# Patient Record
Sex: FEMALE | Race: WHITE | NOT HISPANIC OR LATINO | Employment: UNEMPLOYED | URBAN - METROPOLITAN AREA
[De-identification: names, ages, dates, MRNs, and addresses within clinical notes are randomized per-mention and may not be internally consistent; named-entity substitution may affect disease eponyms.]

---

## 2021-07-21 ENCOUNTER — TRANSFERRED RECORDS (OUTPATIENT)
Dept: HEALTH INFORMATION MANAGEMENT | Facility: CLINIC | Age: 26
End: 2021-07-21

## 2021-07-21 ENCOUNTER — LAB REQUISITION (OUTPATIENT)
Dept: LAB | Facility: CLINIC | Age: 26
End: 2021-07-21
Payer: MEDICAID

## 2021-07-21 DIAGNOSIS — O99.013 ANEMIA COMPLICATING PREGNANCY, THIRD TRIMESTER: ICD-10-CM

## 2021-07-21 LAB
ABO/RH(D): NORMAL
BASOPHILS # BLD AUTO: 0 10E3/UL (ref 0–0.2)
BASOPHILS NFR BLD AUTO: 0 %
EOSINOPHIL # BLD AUTO: 0 10E3/UL (ref 0–0.7)
EOSINOPHIL NFR BLD AUTO: 0 %
ERYTHROCYTE [DISTWIDTH] IN BLOOD BY AUTOMATED COUNT: 15.6 % (ref 10–15)
HBV SURFACE AG SERPL QL IA: NONREACTIVE
HCT VFR BLD AUTO: 33 % (ref 35–47)
HGB BLD-MCNC: 11 G/DL (ref 11.7–15.7)
HIV 1+2 AB+HIV1 P24 AG SERPL QL IA: NEGATIVE
IMM GRANULOCYTES # BLD: 0.1 10E3/UL
IMM GRANULOCYTES NFR BLD: 1 %
LYMPHOCYTES # BLD AUTO: 1.4 10E3/UL (ref 0.8–5.3)
LYMPHOCYTES NFR BLD AUTO: 21 %
MCH RBC QN AUTO: 30.9 PG (ref 26.5–33)
MCHC RBC AUTO-ENTMCNC: 33.3 G/DL (ref 31.5–36.5)
MCV RBC AUTO: 93 FL (ref 78–100)
MONOCYTES # BLD AUTO: 0.4 10E3/UL (ref 0–1.3)
MONOCYTES NFR BLD AUTO: 7 %
NEUTROPHILS # BLD AUTO: 4.7 10E3/UL (ref 1.6–8.3)
NEUTROPHILS NFR BLD AUTO: 71 %
NRBC # BLD AUTO: 0 10E3/UL
NRBC BLD AUTO-RTO: 0 /100
PLATELET # BLD AUTO: 154 10E3/UL (ref 150–450)
RBC # BLD AUTO: 3.56 10E6/UL (ref 3.8–5.2)
SPECIMEN EXPIRATION DATE: NORMAL
WBC # BLD AUTO: 6.7 10E3/UL (ref 4–11)

## 2021-07-21 PROCEDURE — 87340 HEPATITIS B SURFACE AG IA: CPT | Performed by: FAMILY MEDICINE

## 2021-07-21 PROCEDURE — 86850 RBC ANTIBODY SCREEN: CPT | Performed by: FAMILY MEDICINE

## 2021-07-21 PROCEDURE — 86762 RUBELLA ANTIBODY: CPT | Mod: ORL | Performed by: FAMILY MEDICINE

## 2021-07-21 PROCEDURE — 87653 STREP B DNA AMP PROBE: CPT | Mod: ORL | Performed by: FAMILY MEDICINE

## 2021-07-21 PROCEDURE — 86780 TREPONEMA PALLIDUM: CPT | Mod: ORL | Performed by: FAMILY MEDICINE

## 2021-07-21 PROCEDURE — 85025 COMPLETE CBC W/AUTO DIFF WBC: CPT | Mod: ORL | Performed by: FAMILY MEDICINE

## 2021-07-21 PROCEDURE — 87389 HIV-1 AG W/HIV-1&-2 AB AG IA: CPT | Mod: ORL | Performed by: FAMILY MEDICINE

## 2021-07-21 PROCEDURE — 86900 BLOOD TYPING SEROLOGIC ABO: CPT | Performed by: FAMILY MEDICINE

## 2021-07-22 LAB
ABO (EXTERNAL): NORMAL
ANTIBODY SCREEN: NEGATIVE
GROUP B STREPTOCOCCUS (EXTERNAL): NEGATIVE
HEMOGLOBIN (EXTERNAL): 11 G/DL (ref 12–15.5)
HEPATITIS B SURFACE ANTIGEN (EXTERNAL): NONREACTIVE
HIV1+2 AB SERPL QL IA: NEGATIVE
NEGATIVE: NORMAL
PLATELET COUNT (EXTERNAL): 154 10E3/UL (ref 150–450)
RH (EXTERNAL): POSITIVE
RUBELLA ANTIBODY IGG (EXTERNAL): POSITIVE
RUBV IGG SERPL QL IA: POSITIVE
SPECIMEN EXPIRATION DATE: NORMAL
T PALLIDUM AB SER QL: NEGATIVE

## 2021-07-23 LAB
GP B STREP DNA SPEC QL NAA+PROBE: NEGATIVE
PATIENT PENICILLIN, AMOXICILLIN, CEPHALOSPORINS ALLERGY: NORMAL

## 2021-07-28 ENCOUNTER — TRANSFERRED RECORDS (OUTPATIENT)
Dept: HEALTH INFORMATION MANAGEMENT | Facility: CLINIC | Age: 26
End: 2021-07-28

## 2021-08-02 ENCOUNTER — HOSPITAL ENCOUNTER (OUTPATIENT)
Facility: CLINIC | Age: 26
End: 2021-08-02
Admitting: FAMILY MEDICINE
Payer: COMMERCIAL

## 2021-08-16 ENCOUNTER — LAB REQUISITION (OUTPATIENT)
Dept: LAB | Facility: CLINIC | Age: 26
End: 2021-08-16
Payer: COMMERCIAL

## 2021-08-16 ENCOUNTER — TRANSFERRED RECORDS (OUTPATIENT)
Dept: HEALTH INFORMATION MANAGEMENT | Facility: CLINIC | Age: 26
End: 2021-08-16

## 2021-08-16 DIAGNOSIS — O48.0 POST-TERM PREGNANCY: ICD-10-CM

## 2021-08-16 PROCEDURE — U0005 INFEC AGEN DETEC AMPLI PROBE: HCPCS | Mod: ORL | Performed by: FAMILY MEDICINE

## 2021-08-17 DIAGNOSIS — Z33.1 PREGNANT STATE, INCIDENTAL: Primary | ICD-10-CM

## 2021-08-17 LAB
COVID-19: NEGATIVE
SARS-COV-2 RNA RESP QL NAA+PROBE: NEGATIVE

## 2021-08-19 ENCOUNTER — HOSPITAL ENCOUNTER (INPATIENT)
Facility: CLINIC | Age: 26
LOS: 3 days | Discharge: HOME | End: 2021-08-22
Attending: FAMILY MEDICINE | Admitting: FAMILY MEDICINE
Payer: COMMERCIAL

## 2021-08-19 DIAGNOSIS — Z3A.41 41 WEEKS GESTATION OF PREGNANCY: Primary | ICD-10-CM

## 2021-08-19 DIAGNOSIS — O48.0 41 WEEKS GESTATION OF PREGNANCY: Primary | ICD-10-CM

## 2021-08-19 PROBLEM — Z34.90 PREGNANT: Status: ACTIVE | Noted: 2021-08-19

## 2021-08-19 LAB
ABO/RH(D): NORMAL
ANTIBODY SCREEN: NEGATIVE
HGB BLD-MCNC: 11.2 G/DL (ref 11.7–15.7)
HOLD SPECIMEN: NORMAL
SPECIMEN EXPIRATION DATE: NORMAL

## 2021-08-19 PROCEDURE — 250N000009 HC RX 250: Performed by: FAMILY MEDICINE

## 2021-08-19 PROCEDURE — 86900 BLOOD TYPING SEROLOGIC ABO: CPT | Performed by: FAMILY MEDICINE

## 2021-08-19 PROCEDURE — 36415 COLL VENOUS BLD VENIPUNCTURE: CPT | Performed by: FAMILY MEDICINE

## 2021-08-19 PROCEDURE — 120N000001 HC R&B MED SURG/OB

## 2021-08-19 PROCEDURE — 85018 HEMOGLOBIN: CPT | Performed by: FAMILY MEDICINE

## 2021-08-19 PROCEDURE — 250N000013 HC RX MED GY IP 250 OP 250 PS 637: Performed by: FAMILY MEDICINE

## 2021-08-19 RX ORDER — IBUPROFEN 600 MG/1
600 TABLET, FILM COATED ORAL
Status: DISCONTINUED | OUTPATIENT
Start: 2021-08-19 | End: 2021-08-22 | Stop reason: HOSPADM

## 2021-08-19 RX ORDER — NALOXONE HYDROCHLORIDE 0.4 MG/ML
0.2 INJECTION, SOLUTION INTRAMUSCULAR; INTRAVENOUS; SUBCUTANEOUS
Status: DISCONTINUED | OUTPATIENT
Start: 2021-08-19 | End: 2021-08-22 | Stop reason: HOSPADM

## 2021-08-19 RX ORDER — PROCHLORPERAZINE 25 MG
25 SUPPOSITORY, RECTAL RECTAL EVERY 12 HOURS PRN
Status: DISCONTINUED | OUTPATIENT
Start: 2021-08-19 | End: 2021-08-22 | Stop reason: HOSPADM

## 2021-08-19 RX ORDER — METOCLOPRAMIDE 10 MG/1
10 TABLET ORAL EVERY 6 HOURS PRN
Status: DISCONTINUED | OUTPATIENT
Start: 2021-08-19 | End: 2021-08-22 | Stop reason: HOSPADM

## 2021-08-19 RX ORDER — HYDROXYZINE HYDROCHLORIDE 25 MG/1
25 TABLET, FILM COATED ORAL EVERY 6 HOURS PRN
Status: DISCONTINUED | OUTPATIENT
Start: 2021-08-19 | End: 2021-08-22 | Stop reason: HOSPADM

## 2021-08-19 RX ORDER — MORPHINE SULFATE 10 MG/ML
10 INJECTION, SOLUTION INTRAMUSCULAR; INTRAVENOUS ONCE
Status: DISCONTINUED | OUTPATIENT
Start: 2021-08-19 | End: 2021-08-22 | Stop reason: HOSPADM

## 2021-08-19 RX ORDER — PROCHLORPERAZINE MALEATE 10 MG
10 TABLET ORAL EVERY 6 HOURS PRN
Status: DISCONTINUED | OUTPATIENT
Start: 2021-08-19 | End: 2021-08-22 | Stop reason: HOSPADM

## 2021-08-19 RX ORDER — MISOPROSTOL 100 UG/1
25 TABLET ORAL
Status: COMPLETED | OUTPATIENT
Start: 2021-08-19 | End: 2021-08-20

## 2021-08-19 RX ORDER — LIDOCAINE HYDROCHLORIDE 20 MG/ML
JELLY TOPICAL ONCE
Status: COMPLETED | OUTPATIENT
Start: 2021-08-19 | End: 2021-08-19

## 2021-08-19 RX ORDER — NALOXONE HYDROCHLORIDE 0.4 MG/ML
0.4 INJECTION, SOLUTION INTRAMUSCULAR; INTRAVENOUS; SUBCUTANEOUS
Status: DISCONTINUED | OUTPATIENT
Start: 2021-08-19 | End: 2021-08-22 | Stop reason: HOSPADM

## 2021-08-19 RX ORDER — ONDANSETRON 4 MG/1
4 TABLET, ORALLY DISINTEGRATING ORAL EVERY 6 HOURS PRN
Status: DISCONTINUED | OUTPATIENT
Start: 2021-08-19 | End: 2021-08-22 | Stop reason: HOSPADM

## 2021-08-19 RX ORDER — OXYTOCIN 10 [USP'U]/ML
10 INJECTION, SOLUTION INTRAMUSCULAR; INTRAVENOUS
Status: DISCONTINUED | OUTPATIENT
Start: 2021-08-19 | End: 2021-08-22 | Stop reason: HOSPADM

## 2021-08-19 RX ORDER — KETOROLAC TROMETHAMINE 30 MG/ML
30 INJECTION, SOLUTION INTRAMUSCULAR; INTRAVENOUS
Status: DISCONTINUED | OUTPATIENT
Start: 2021-08-19 | End: 2021-08-22 | Stop reason: HOSPADM

## 2021-08-19 RX ORDER — METOCLOPRAMIDE HYDROCHLORIDE 5 MG/ML
10 INJECTION INTRAMUSCULAR; INTRAVENOUS EVERY 6 HOURS PRN
Status: DISCONTINUED | OUTPATIENT
Start: 2021-08-19 | End: 2021-08-22 | Stop reason: HOSPADM

## 2021-08-19 RX ORDER — HYDROXYZINE HYDROCHLORIDE 25 MG/1
100 TABLET, FILM COATED ORAL EVERY 6 HOURS PRN
Status: DISCONTINUED | OUTPATIENT
Start: 2021-08-19 | End: 2021-08-22 | Stop reason: HOSPADM

## 2021-08-19 RX ORDER — HYDROXYZINE HYDROCHLORIDE 25 MG/1
50 TABLET, FILM COATED ORAL
Status: DISCONTINUED | OUTPATIENT
Start: 2021-08-19 | End: 2021-08-22 | Stop reason: HOSPADM

## 2021-08-19 RX ORDER — PRENATAL VIT/IRON FUM/FOLIC AC 27MG-0.8MG
1 TABLET ORAL DAILY
COMMUNITY

## 2021-08-19 RX ORDER — ONDANSETRON 2 MG/ML
4 INJECTION INTRAMUSCULAR; INTRAVENOUS EVERY 6 HOURS PRN
Status: DISCONTINUED | OUTPATIENT
Start: 2021-08-19 | End: 2021-08-22 | Stop reason: HOSPADM

## 2021-08-19 RX ORDER — FERROUS SULFATE 325(65) MG
325 TABLET ORAL
COMMUNITY

## 2021-08-19 RX ORDER — OXYTOCIN/0.9 % SODIUM CHLORIDE 30/500 ML
100-340 PLASTIC BAG, INJECTION (ML) INTRAVENOUS CONTINUOUS PRN
Status: DISCONTINUED | OUTPATIENT
Start: 2021-08-19 | End: 2021-08-22 | Stop reason: HOSPADM

## 2021-08-19 RX ORDER — FENTANYL CITRATE 50 UG/ML
50-100 INJECTION, SOLUTION INTRAMUSCULAR; INTRAVENOUS
Status: DISCONTINUED | OUTPATIENT
Start: 2021-08-19 | End: 2021-08-22 | Stop reason: HOSPADM

## 2021-08-19 RX ADMIN — MISOPROSTOL 25 MCG: 100 TABLET ORAL at 22:16

## 2021-08-19 RX ADMIN — MISOPROSTOL 25 MCG: 100 TABLET ORAL at 16:16

## 2021-08-19 RX ADMIN — MISOPROSTOL 25 MCG: 100 TABLET ORAL at 12:18

## 2021-08-19 RX ADMIN — MISOPROSTOL 25 MCG: 100 TABLET ORAL at 20:14

## 2021-08-19 RX ADMIN — MISOPROSTOL 25 MCG: 100 TABLET ORAL at 09:43

## 2021-08-19 RX ADMIN — LIDOCAINE HYDROCHLORIDE: 20 JELLY TOPICAL at 13:25

## 2021-08-19 RX ADMIN — MISOPROSTOL 25 MCG: 100 TABLET ORAL at 18:17

## 2021-08-19 RX ADMIN — MISOPROSTOL 25 MCG: 100 TABLET ORAL at 14:22

## 2021-08-19 NOTE — H&P
OBSTETRICS ADMISSION HISTORY & PHYSICAL  DATE OF ADMISSION: 2021  7:40 AM        Assessment and Plan:   Assessment:   Megan Rodriguez is a 25 year old  at 41w0d presenting for induction of labor secondary to post date.     Patient Active Problem List   Diagnosis     Pregnant         PLAN:   1. Admit - see IP orders  2. cervical ripening with misoprostol PO  3. Pain medication- patient will ask when ready for epidural  4. Theraputic sleep  5. MD consultant on call [facility]/ available prn  6. Anticipate   7. GBS: negative. Antibiotics are notindicated   8. Comfort plan: Epidural  9. Will monitor labor progress along with RN and update attending physician  5.   Will notify Amari Myers as indicated    Patient discussed with attending physician, Dr. Amari Pierce via RN, who agrees with the plan.     Itzel Onofre MD PGY1 2021  [facility] Medicine Residency Program         Chief Complaint:     Induction of labor secondary to post date.        History of Present Illness:     Megan Rodriguez is a 25 year old year old  at 41w0d. Patient received prenatal care with Amari Jennings at [facility].    Presents to the [facility] for induction of labor, indication post-dates.    She reports irregular contractions over the past few days with minimal discomfort. Denies fluid leakage. Denies bleeding per vagina. Fetal movement is normal.  Denies headache, changes in vision, abdominal pain, or swelling.    Their prenatal course has been uncomplicated.    Prenatal labs   Lab Results   Component Value Date    AS Negative 2021    HGB 11.2 (L) 2021       GBS was collected on 2021-Negative           Obstetrical History:     OB History    Para Term  AB Living   1 0 0 0 0 0   SAB TAB Ectopic Multiple Live Births   0 0 0 0 0      # Outcome Date GA Lbr Fernando/2nd Weight Sex Delivery Anes PTL Lv   1 Current                        Immunzations:     Tdap this pregnancy?  UNKNOWN  Flu shot this pregnancy?UNKNOWN  COVID vaccine? UNKNOWN         Past Medical History:   Denies past medical history. Denies history of asthma, hypertension or diabetes.          Past Surgical History:   Denies surgical history.          Family History:   Denies history of bleeding or clotting disorder.          Social History:   Denies tobacco use, etoh or drug use in pregnancy. Mother and sister present and supportive.          Medications:   Iron supplement and PNV.          Allergies:   Patient has no known allergies.         Review of Systems:   CONSTITUTIONAL: no fatigue, no unexpected change in weight  SKIN: no worrisome rashes or lesions  EYES: no acute vision problems or changes  ENT: no ear problems, no mouth problems, no throat problems  RESP: no significant cough, no shortness of breath  CV: no chest pain, no palpitations, no new or worsening peripheral edema  GI: no nausea, no vomiting, no constipation, no diarrhea  : no frequency, no dysuria, no hematuria  NEURO: no weakness, no dizziness, no headaches  ENDOCRINE: no temperature intolerance, no skin/hair changes  PSYCHIATRIC: NEGATIVE for changes in mood or trouble with sleep         Physical Exam:   Vitals:   There were no vitals taken for this visit.  0 lbs 0 oz  There is no height or weight on file to calculate BMI.    GEN: Awake, alert in no apparent distress   HEENT: grossly normal  RESPIRATORY: clear to auscultation bilaterally, no increased work of breathing  CARDIOVASCULAR: RRR, no murmur  ABDOMEN: gravid, non-tender  PELVIC:  no fluid noted, no blood noted  Cervix: Difficult cervical exam, unable to complete at this time  EXT:  no edema or calf tenderness  Confirmed VTX by Ultrasound? Confirmed vertex on US on 8/19/21.    Electronic Fetal Monitoring:  Baseline rate normal  Variability moderate  Accelerations present  Decelerations not present    Assessment: Category I EFM interpretation  suggests absence of concern for fetal metabolic acidemia at this time due to moderate variability    Uterine Activity irregular.      Strip reviewed on unit    NST interpretation:  Baseline rate 150 normal  Accelerations present  Decelerations not present  Interpretation: reactive, Category 1

## 2021-08-19 NOTE — PROGRESS NOTES
Second SVE attempt by the RN writing this note at 1420. That attempt was with eurogel, unsuccessful.  Dr. Pierce and the resident are aware.  No new orders, continue with Cytotec.

## 2021-08-19 NOTE — PROGRESS NOTES
Dr. Pierce updated on Meagn's admission.  Dr discussing with the RN writing this note the difficulty on performing a SVE on her as they're very painful, orders received to help with exam.  From his prior attempt at an exam, Dr Pierce feels her cervix is very posterior and thick, guessing a ruelas of 3.  Orders received for PO cytotec unless SVE by RN warrants otherwise.  Dr will call for an update around noon and will stop by after clinic to evaluate Megan.   denies having any further questions.

## 2021-08-19 NOTE — PROGRESS NOTES
RN at bedside for 15 minutes following Nitrous Oxide 50/50 inhalation initiation. Patient is observed using the equipment appropriately. Patient appears to be coping with labor. Patient is free of side effects.    Elizabeth Rueda, RN

## 2021-08-20 PROCEDURE — 250N000013 HC RX MED GY IP 250 OP 250 PS 637

## 2021-08-20 PROCEDURE — 250N000009 HC RX 250: Performed by: FAMILY MEDICINE

## 2021-08-20 PROCEDURE — 258N000003 HC RX IP 258 OP 636: Performed by: FAMILY MEDICINE

## 2021-08-20 PROCEDURE — 250N000013 HC RX MED GY IP 250 OP 250 PS 637: Performed by: FAMILY MEDICINE

## 2021-08-20 PROCEDURE — 120N000001 HC R&B MED SURG/OB

## 2021-08-20 RX ORDER — MISOPROSTOL 100 UG/1
25 TABLET ORAL
Status: COMPLETED | OUTPATIENT
Start: 2021-08-20 | End: 2021-08-20

## 2021-08-20 RX ORDER — OXYTOCIN/0.9 % SODIUM CHLORIDE 30/500 ML
1-24 PLASTIC BAG, INJECTION (ML) INTRAVENOUS CONTINUOUS
Status: DISCONTINUED | OUTPATIENT
Start: 2021-08-20 | End: 2021-08-20 | Stop reason: CLARIF

## 2021-08-20 RX ORDER — LIDOCAINE 40 MG/G
CREAM TOPICAL
Status: DISCONTINUED | OUTPATIENT
Start: 2021-08-20 | End: 2021-08-22 | Stop reason: HOSPADM

## 2021-08-20 RX ORDER — SODIUM CHLORIDE, SODIUM LACTATE, POTASSIUM CHLORIDE, CALCIUM CHLORIDE 600; 310; 30; 20 MG/100ML; MG/100ML; MG/100ML; MG/100ML
INJECTION, SOLUTION INTRAVENOUS CONTINUOUS
Status: DISCONTINUED | OUTPATIENT
Start: 2021-08-20 | End: 2021-08-22 | Stop reason: HOSPADM

## 2021-08-20 RX ORDER — TERBUTALINE SULFATE 1 MG/ML
0.25 INJECTION, SOLUTION SUBCUTANEOUS
Status: DISCONTINUED | OUTPATIENT
Start: 2021-08-20 | End: 2021-08-22 | Stop reason: HOSPADM

## 2021-08-20 RX ADMIN — MISOPROSTOL 25 MCG: 100 TABLET ORAL at 00:37

## 2021-08-20 RX ADMIN — Medication 2 MILLI-UNITS/MIN: at 16:07

## 2021-08-20 RX ADMIN — MISOPROSTOL 25 MCG: 100 TABLET ORAL at 10:52

## 2021-08-20 RX ADMIN — SODIUM CHLORIDE, POTASSIUM CHLORIDE, SODIUM LACTATE AND CALCIUM CHLORIDE 75 ML/HR: 600; 310; 30; 20 INJECTION, SOLUTION INTRAVENOUS at 16:07

## 2021-08-20 RX ADMIN — MISOPROSTOL 25 MCG: 100 TABLET ORAL at 06:32

## 2021-08-20 RX ADMIN — MISOPROSTOL 25 MCG: 100 TABLET ORAL at 04:26

## 2021-08-20 RX ADMIN — MISOPROSTOL 25 MCG: 100 TABLET ORAL at 02:31

## 2021-08-20 RX ADMIN — MISOPROSTOL 25 MCG: 100 TABLET ORAL at 08:52

## 2021-08-20 RX ADMIN — MISOPROSTOL 25 MCG: 100 TABLET ORAL at 12:59

## 2021-08-20 NOTE — PROGRESS NOTES
Labor Progress Note    Assessment/Plan  25 year old  at 41w1d gestational age admitted for induction of labor for postdates.     -dose #12 of cytotec today at 0852   -per discussion with Dr. Cotton, would like 2 extra doses     -due 1100 and 1300  - Continue to monitor FHR  - Anticipate   - sensitive cervical check   -tried topical lidocaine and NO with last attempts to ease pain    -check only after cytotec doses completed to assess need for cervidil vs pitocin for further ripening    Subjective  Patient was able to rest well overnight.  She reported that her contractions were starting to grow longer and slightly more intense, though they are still not too uncomfortable.  She reported some intermittent upper abdominal pain but none right now and usually more on the left than the right.  She reported getting very sweaty overnight but didn't feel feverish.     She is eager to have her baby and looking forward to whatever happens today.     Objective  Vital signs in last 24 hours  Temp:  [97.6  F (36.4  C)] 97.6  F (36.4  C)  Pulse:  [90] 90  Resp:  [16] 16  BP: (107-109)/(59-65) 107/65      Physical Exam  General: alert, conversant young woman who appears stated age  Abd: Gravid, soft, nontender  Cervix: no recent checks due to extreme sensitivity to exam  FHR: Baseline 145/moderate variability/present accels/absent decels; Category 1 tracing  [place]: Contractions irregular, between q5-7 minutes on most tracings  Extremities: trace peripheral edema, bilateral skin slough on palmar surface of feet (patient recently did a foot mask)    Pertinent Labs   Lab Results   Component Value Date    ABORH A POS 2021    HGB 11.2 2021            Patient discussed with attending physician, Dr. Amari Pierce , who agrees with the plan.     Eloise Muhammad MD PGY1 2021  [facility] Family Medicine Residency Program

## 2021-08-20 NOTE — PROGRESS NOTES
FM/OB/Antepartum Note    A: IUP at 41+1 weeks here for post dates induction      Prime with a history of significant vaginal pain      Most prenatal care in [place]    P:  I have visited with her and her family 3 times now including last night and this morning. We discussed hat without being able to do a vaginal exam that we are going to have to use her contraction pattern for our decision making. We discussed the risks of pitocin as well as the misoprostol that we already used. We also discussed that these inductions are often days.    S: Patient has had 14 doses of misoprostol and she has had some contractions. She is tolerating them well. No HA or swelling.    O: VSS       Abdomen: gravid, EFW~7 1/4#       FHT's: 140's with moderate variability and accelerations. She did have some mild decelerations about 0130 that improved with position changes.    Kevin Pierce MD  [facility]

## 2021-08-21 PROCEDURE — 258N000003 HC RX IP 258 OP 636: Performed by: FAMILY MEDICINE

## 2021-08-21 PROCEDURE — 120N000001 HC R&B MED SURG/OB

## 2021-08-21 PROCEDURE — 250N000009 HC RX 250: Performed by: FAMILY MEDICINE

## 2021-08-21 RX ORDER — TERBUTALINE SULFATE 1 MG/ML
0.25 INJECTION, SOLUTION SUBCUTANEOUS
Status: DISCONTINUED | OUTPATIENT
Start: 2021-08-21 | End: 2021-08-22 | Stop reason: HOSPADM

## 2021-08-21 RX ORDER — OXYTOCIN/0.9 % SODIUM CHLORIDE 30/500 ML
1-24 PLASTIC BAG, INJECTION (ML) INTRAVENOUS CONTINUOUS
Status: DISCONTINUED | OUTPATIENT
Start: 2021-08-21 | End: 2021-08-22 | Stop reason: HOSPADM

## 2021-08-21 RX ORDER — MISOPROSTOL 100 UG/1
25 TABLET ORAL
Status: DISCONTINUED | OUTPATIENT
Start: 2021-08-21 | End: 2021-08-22 | Stop reason: HOSPADM

## 2021-08-21 RX ORDER — LIDOCAINE 40 MG/G
CREAM TOPICAL
Status: DISCONTINUED | OUTPATIENT
Start: 2021-08-21 | End: 2021-08-22 | Stop reason: HOSPADM

## 2021-08-21 RX ADMIN — SODIUM CHLORIDE, POTASSIUM CHLORIDE, SODIUM LACTATE AND CALCIUM CHLORIDE: 600; 310; 30; 20 INJECTION, SOLUTION INTRAVENOUS at 09:26

## 2021-08-21 RX ADMIN — Medication 2 MILLI-UNITS/MIN: at 09:23

## 2021-08-21 NOTE — PROGRESS NOTES
FM/OB Antepartum Note    A: IUP at 41+2 weeks      Day 3 of cervical ripening    P: We discussed options for today. Given that we do not know where her Talavera's score is and she slept well. I thought we should retry low dose pitocin. I suggested that we do this through the day and if there is no increase in the labor pattern that we consider discharge tonight and recheck in the office on Monday. Of course this assumes that her baby continues to have a reassuring FHT pattern.    S: No HA or vision trouble. Slept well last night. Having some mild, intermittent contractions.    O: VSS       Abdomen, gravid with vertex presentation       FHT's currently category 1 although some variables earlier       No cervical exam    Kevin Pierce MD  [facility] [[email or web address]]

## 2021-08-21 NOTE — PROGRESS NOTES
Labor Progress Note    Assessment/Plan  25 year old  at 41w2d gestational age admitted for postdates induction of labor.     -continue pitocin for ripening until 1900, then reassess  -monitor patient headache   -PRN tylenol for pain relief   -no alteration in vision, no elevated blood pressures, no RUQ tenderness   -+2 pitting edema of bilateral lower extremities   -if elevated blood pressures noted, consider HELLP labs   -epidural anesthesia once patient is ready for it and labor has progressed  - sensitive cervical check              -ensure IV pain meds, lidocaine jelly, and hydroxyzine for next check     - Continue to monitor FHR  - Anticipate     Subjective  Patient is resting in bed.  She has been up on the exercise ball and walking the halls quite a bit today.  She is starting to feel contractions more and is growing a bit uncomfortable when they occur.  She reports a minor headache but no vision changes, RUQ pain, or frothy urine.      She is looking forward to having her baby.  Offered reassurance about her progress so far.  Updated on plan to run pitocin until approximately 1900 tonight and reassess plan.       Objective  Vital signs in last 24 hours  Temp:  [97.6  F (36.4  C)-98  F (36.7  C)] 97.8  F (36.6  C)  Pulse:  [88] 88  Resp:  [14-16] 16  BP: (100-116)/(57-70) 116/69  SpO2:  [99 %] 99 %      Physical Exam  General: alert, conversant young woman who appears stated age  Abd: Gravid, soft, nontender  Cervix: not evaluated yet  FHR: Baseline 145/moderate variability/present accels/absent decels; Category 1 tracing  [place]: Contractions Q 3-4 min, periods of established rhythm with periods of irregularity  Extremities: +2 pitting peripheral edema    Pertinent Labs   Lab Results   Component Value Date    ABORH A POS 2021    HGB 11.2 2021            Patient discussed with attending physician, Dr. Amari Pierce , who agrees with the plan.     Eloise Muhammad MD PGY1  8/21/2021  [facility] Family Medicine Residency Program

## 2021-08-21 NOTE — PROGRESS NOTES
Labor Progress Note    Assessment/Plan  25 year old  at 41w1d gestational age admitted for induction of labor for postdates. Patient has completed 14 doses of cytotec. Currently on low dose pitocin. Baby continues to be a category I. Unclear how patient is progressing given not able to tolerate cervical checks, though is starting to feel mild contractions. Vital signs are stable and overall course is reassuring.     - Continue to monitor FHR  - Anticipate   - Continue low dose pitocin until 11pm, will discontinue for sleep  - Will take patient off the monitor before sleep  - Plan to reassess in AM   - Plan for epidural    Subjective  Feeling well overall. Reports mild contractions mostly in her mid abdomen, but low when she stands up. She is comfortable. Reports no headache, vision changes, chest pain, SOB, or leg pain. Plans for an epidural. All questions answered, no concerns at this time.     Objective  Vital signs in last 24 hours  Temp:  [97.6  F (36.4  C)-98.7  F (37.1  C)] 97.7  F (36.5  C)  Pulse:  [85] 85  Resp:  [14-16] 14  BP: (100-114)/(57-70) 100/57      Physical Exam  General: alert, resting comfortably in bed. Patient's mom and sister are attentive at bedside  Abd: Gravid, soft, nontender  Cervix: no recent checks due to extreme sensitivity to exam  FHR: Baseline 140/moderate variability/present accels/absent decels; Category 1 tracing  [place]: Contractions irregular, between q2-6 minutes on most tracings  Extremities: trace peripheral edema, bilateral skin slough on palmar surface of feet (patient recently did a foot mask)    Pertinent Labs   Lab Results   Component Value Date    ABORH A POS 2021    HGB 11.2 2021       Patient discussed with attending physician, , who agrees with the plan.     Kelly Washburn MD PGY1 2021  [facility] Family Medicine Residency Program

## 2021-08-21 NOTE — PLAN OF CARE
Updated Dr. Pierce that patient is doing well but starting to feel the contractions.  I was able to get a vaginal exam and we are going to continue with pitocin till 11pm and them reevaluate for further plan.  Patient would like to continue with pitocin instead of go home

## 2021-08-21 NOTE — PLAN OF CARE
Problem: Adult Inpatient Plan of Care  Goal: Optimal Comfort and Wellbeing  Outcome: Improving     Problem: Change in Fetal Wellbeing (Labor)  Goal: Stable Fetal Wellbeing  Outcome: Improving     Problem: Delayed Labor Progression (Labor)  Goal: Effective Progression to Delivery  Outcome: Improving     Pitocin increased to 18 mL/hr with no change in patient condition. Patient able to sleep through contractions. OB Resident and Dr. Pierce notified, orders to stop Oxytocin and take patient off monitor overnight. Vital signs stable, assessment findings WDL. Patient declining sleep medications at this point. FHR baseline 145 with accelerations and no decelerations. Contraction pattern irregular.

## 2021-08-22 VITALS
HEART RATE: 88 BPM | TEMPERATURE: 98.1 F | RESPIRATION RATE: 16 BRPM | OXYGEN SATURATION: 99 % | DIASTOLIC BLOOD PRESSURE: 64 MMHG | SYSTOLIC BLOOD PRESSURE: 105 MMHG

## 2021-08-22 PROCEDURE — 250N000013 HC RX MED GY IP 250 OP 250 PS 637

## 2021-08-22 RX ADMIN — MISOPROSTOL 25 MCG: 100 TABLET ORAL at 06:30

## 2021-08-22 RX ADMIN — MISOPROSTOL 25 MCG: 100 TABLET ORAL at 04:28

## 2021-08-22 RX ADMIN — MISOPROSTOL 25 MCG: 100 TABLET ORAL at 02:26

## 2021-08-22 RX ADMIN — MISOPROSTOL 25 MCG: 100 TABLET ORAL at 00:17

## 2021-08-22 NOTE — PROGRESS NOTES
After discussion with pt and dr. Pierce pt has decided to take a break from induction.   Plan of care: discontinue pt home. Follow up with md in clinic tomorrow and plan for repeat induction on Wednesday

## 2021-08-22 NOTE — PROGRESS NOTES
Labor Progress Note    Assessment/Plan  25 year old  at 41w2d gestational age admitted for induction of labor for postdates. Patient has completed cytotec and pitocin and tolerated well. Discussed with attending and will discontinue pitocin to allow for therapeutic rest and begin cytotec overnight if no tachysystole and reassess in AM. Patient previously slept well with cytotec.     -Discontinue pitocin at 9:30pm  -Start oral cytotec overnight if contractions decrease in intensity and patient is not having tachysystole  - If tachysystole, will not start cytotec and just encourage rest and reassess in AM  - Continue to monitor FHR  - Anticipate   -Cervical recheck in AM    Subjective  Doing well overall, though anxious about length of induction. Reassured patient, all questions answered, and no further concerns at this time.     Objective  Vital signs in last 24 hours  Temp:  [97.5  F (36.4  C)-97.8  F (36.6  C)] 97.5  F (36.4  C)  Pulse:  [88] 88  Resp:  [16] 16  BP: (107-116)/(58-69) 107/58  SpO2:  [99 %] 99 %      Physical Exam  General: Awake, comforable on ball at bedside. Patient's mom and sister attentive and supportive at bedside.   Cervix: 0-1cm, 60% effaced, -2 station  FHR: Baseline 150/moderate variability/ accels/no decels; Category I tracing  [place]: Contractions Q 2 min    Pertinent Labs   Lab Results   Component Value Date    ABORH A POS 2021    HGB 11.2 2021        Patient discussed with attending physician, Dr. Pierce , who agrees with the plan.     Kelly Washburn MD PGY1 2021  [facility] Family Medicine Residency Program

## 2021-08-22 NOTE — DISCHARGE INSTRUCTIONS
OB Triage Discharge Instructions    Diet:  Regular    Activity:  As tolerated    Other Special Instructions:Follow up in clinic on Monday at 9 AM with Dr. Pierce    Reason for being seen in L&D: Induction trial     Call the [facility] at [phone] if you have:  ? 5 or more contractions in one hour  ? Leaking of fluid from your vaginal area  ? Decreased fetal movement (follow kick count instructions)  ? Bleeding from your vaginal area  ? Swelling in your face, or increased swelling in your hands or legs  ? Headaches or vision problems such as blurring or seeing spots or starts  ? Nausea or vomiting lasting for more than 24 hours  ? An increase in weight (5lbs/week)  ? Epigastric pain (sometimes confused with heartburn that does not go away or a very bad stomach ache)    If you have any questions, please follow up with your doctor.        Patient Signature: ______________________________________________________________  By signing the above I acknowledge that a nurse or my care provider has explained the instruction to me and I have had any questions regarding my care explained to me.        Discharge Nurse Signature: _______________________________ 8/22/2021  9:51 AM    Method of discharge: Ambulatory    Accompanied by: Family  Time of discharge: 10:00 AM

## 2021-08-22 NOTE — PLAN OF CARE
Problem: Change in Fetal Wellbeing (Labor)  Goal: Stable Fetal Wellbeing  Outcome: Improving     Problem: Labor Pain (Labor)  Goal: Acceptable Pain Control  Outcome: Improving     Pitocin run up to 24 mL/hr at 2035, OB resident and Dr. Pierce notified. Plan to stop pitocin at 2100 and start cytotec after a break. First cytotec dose given at 0017. FHR baseline 135-150 with accelerations and no decelerations. Contraction pattern irregular, palpate mild to moderate. Patient able to sleep throughout night and is resting comfortably. Sister and mom at bedside, attentive and supportive. Continue with current plan of care.

## 2021-08-22 NOTE — DISCHARGE SUMMARY
FM/OB Antepartum/Discharge Note      A: 1) IUP at 41 +3 weeks with Bishops of 5/6 and 3 full days of cervical ripening with minimal change       2) GBS negative, prenatal care in [place], we believe good dating       3) Baby has looked good, category 1 most of the time and BPP 8/8 on 8/16.    P: We had a long discussion regarding options. I told her that she should consider discharge with follow-up in the office tomorrow. We all felt that she would do better outside of the hospital for a couple of days to refresh and gets some energy back. We discussed options regarding other mechanisms of ripening but they were in agreement with the plan. Induction scheduled for 8/25 and office visit and BPP tomorrow or 2 days (BPP).     S: Had stronger contractions yesterday. Slept with the Cytotec. She is in good spirits. Tolerated a vaginal exam last night. Bishops 5-6. No HA or visual problems.    O: VSS       Gravid abdomen       Cx: 1/ vtx/ -2 /60% ([facility] on 8/21)       FHT's category 1    Kevin Pierce  [facility]

## 2021-08-23 ENCOUNTER — LAB REQUISITION (OUTPATIENT)
Dept: LAB | Facility: CLINIC | Age: 26
End: 2021-08-23
Payer: COMMERCIAL

## 2021-08-23 DIAGNOSIS — O48.0 POST-TERM PREGNANCY: ICD-10-CM

## 2021-08-23 PROCEDURE — U0003 INFECTIOUS AGENT DETECTION BY NUCLEIC ACID (DNA OR RNA); SEVERE ACUTE RESPIRATORY SYNDROME CORONAVIRUS 2 (SARS-COV-2) (CORONAVIRUS DISEASE [COVID-19]), AMPLIFIED PROBE TECHNIQUE, MAKING USE OF HIGH THROUGHPUT TECHNOLOGIES AS DESCRIBED BY CMS-2020-01-R: HCPCS | Mod: ORL | Performed by: FAMILY MEDICINE

## 2021-08-24 LAB — SARS-COV-2 RNA RESP QL NAA+PROBE: NEGATIVE

## 2021-08-25 ENCOUNTER — HOSPITAL ENCOUNTER (INPATIENT)
Facility: CLINIC | Age: 26
LOS: 4 days | Discharge: HOME | End: 2021-08-29
Attending: FAMILY MEDICINE | Admitting: FAMILY MEDICINE
Payer: COMMERCIAL

## 2021-08-25 DIAGNOSIS — Z3A.42 42 WEEKS GESTATION OF PREGNANCY: Primary | ICD-10-CM

## 2021-08-25 DIAGNOSIS — O48.0 42 WEEKS GESTATION OF PREGNANCY: Primary | ICD-10-CM

## 2021-08-25 PROBLEM — Z36.89 ENCOUNTER FOR TRIAGE IN PREGNANT PATIENT: Status: ACTIVE | Noted: 2021-08-25

## 2021-08-25 PROBLEM — Z37.9 NORMAL LABOR: Status: ACTIVE | Noted: 2021-08-25

## 2021-08-25 LAB
HOLD SPECIMEN: NORMAL
HOLD SPECIMEN: NORMAL

## 2021-08-25 PROCEDURE — 250N000013 HC RX MED GY IP 250 OP 250 PS 637

## 2021-08-25 PROCEDURE — 120N000001 HC R&B MED SURG/OB

## 2021-08-25 RX ORDER — OXYTOCIN 10 [USP'U]/ML
10 INJECTION, SOLUTION INTRAMUSCULAR; INTRAVENOUS
Status: DISCONTINUED | OUTPATIENT
Start: 2021-08-25 | End: 2021-08-26

## 2021-08-25 RX ORDER — IBUPROFEN 600 MG/1
600 TABLET, FILM COATED ORAL
Status: DISCONTINUED | OUTPATIENT
Start: 2021-08-25 | End: 2021-08-29 | Stop reason: HOSPADM

## 2021-08-25 RX ORDER — MISOPROSTOL 100 UG/1
25 TABLET ORAL
Status: DISCONTINUED | OUTPATIENT
Start: 2021-08-25 | End: 2021-08-27 | Stop reason: HOSPADM

## 2021-08-25 RX ORDER — METHYLERGONOVINE MALEATE 0.2 MG/ML
200 INJECTION INTRAVENOUS
Status: DISCONTINUED | OUTPATIENT
Start: 2021-08-25 | End: 2021-08-27 | Stop reason: HOSPADM

## 2021-08-25 RX ORDER — NALOXONE HYDROCHLORIDE 0.4 MG/ML
0.2 INJECTION, SOLUTION INTRAMUSCULAR; INTRAVENOUS; SUBCUTANEOUS
Status: DISCONTINUED | OUTPATIENT
Start: 2021-08-25 | End: 2021-08-27 | Stop reason: HOSPADM

## 2021-08-25 RX ORDER — ONDANSETRON 4 MG/1
4 TABLET, ORALLY DISINTEGRATING ORAL EVERY 6 HOURS PRN
Status: DISCONTINUED | OUTPATIENT
Start: 2021-08-25 | End: 2021-08-27 | Stop reason: HOSPADM

## 2021-08-25 RX ORDER — OXYTOCIN/0.9 % SODIUM CHLORIDE 30/500 ML
340 PLASTIC BAG, INJECTION (ML) INTRAVENOUS CONTINUOUS PRN
Status: DISCONTINUED | OUTPATIENT
Start: 2021-08-25 | End: 2021-08-26

## 2021-08-25 RX ORDER — PROCHLORPERAZINE 25 MG
25 SUPPOSITORY, RECTAL RECTAL EVERY 12 HOURS PRN
Status: DISCONTINUED | OUTPATIENT
Start: 2021-08-25 | End: 2021-08-27 | Stop reason: HOSPADM

## 2021-08-25 RX ORDER — NALOXONE HYDROCHLORIDE 0.4 MG/ML
0.4 INJECTION, SOLUTION INTRAMUSCULAR; INTRAVENOUS; SUBCUTANEOUS
Status: DISCONTINUED | OUTPATIENT
Start: 2021-08-25 | End: 2021-08-27 | Stop reason: HOSPADM

## 2021-08-25 RX ORDER — ONDANSETRON 2 MG/ML
4 INJECTION INTRAMUSCULAR; INTRAVENOUS EVERY 6 HOURS PRN
Status: DISCONTINUED | OUTPATIENT
Start: 2021-08-25 | End: 2021-08-27 | Stop reason: HOSPADM

## 2021-08-25 RX ORDER — LIDOCAINE 40 MG/G
CREAM TOPICAL
Status: DISCONTINUED | OUTPATIENT
Start: 2021-08-25 | End: 2021-08-25 | Stop reason: HOSPADM

## 2021-08-25 RX ORDER — FENTANYL CITRATE 50 UG/ML
50-100 INJECTION, SOLUTION INTRAMUSCULAR; INTRAVENOUS
Status: DISCONTINUED | OUTPATIENT
Start: 2021-08-25 | End: 2021-08-27 | Stop reason: HOSPADM

## 2021-08-25 RX ORDER — MISOPROSTOL 200 UG/1
800 TABLET ORAL
Status: DISCONTINUED | OUTPATIENT
Start: 2021-08-25 | End: 2021-08-27 | Stop reason: HOSPADM

## 2021-08-25 RX ORDER — METOCLOPRAMIDE HYDROCHLORIDE 5 MG/ML
10 INJECTION INTRAMUSCULAR; INTRAVENOUS EVERY 6 HOURS PRN
Status: DISCONTINUED | OUTPATIENT
Start: 2021-08-25 | End: 2021-08-27 | Stop reason: HOSPADM

## 2021-08-25 RX ORDER — OXYTOCIN/0.9 % SODIUM CHLORIDE 30/500 ML
100-340 PLASTIC BAG, INJECTION (ML) INTRAVENOUS CONTINUOUS PRN
Status: DISCONTINUED | OUTPATIENT
Start: 2021-08-25 | End: 2021-08-26

## 2021-08-25 RX ORDER — ACETAMINOPHEN 325 MG/1
650 TABLET ORAL EVERY 4 HOURS PRN
Status: DISCONTINUED | OUTPATIENT
Start: 2021-08-25 | End: 2021-08-27 | Stop reason: HOSPADM

## 2021-08-25 RX ORDER — PROCHLORPERAZINE MALEATE 10 MG
10 TABLET ORAL EVERY 6 HOURS PRN
Status: DISCONTINUED | OUTPATIENT
Start: 2021-08-25 | End: 2021-08-27 | Stop reason: HOSPADM

## 2021-08-25 RX ORDER — CARBOPROST TROMETHAMINE 250 UG/ML
250 INJECTION, SOLUTION INTRAMUSCULAR
Status: DISCONTINUED | OUTPATIENT
Start: 2021-08-25 | End: 2021-08-27 | Stop reason: HOSPADM

## 2021-08-25 RX ORDER — METOCLOPRAMIDE 10 MG/1
10 TABLET ORAL EVERY 6 HOURS PRN
Status: DISCONTINUED | OUTPATIENT
Start: 2021-08-25 | End: 2021-08-27 | Stop reason: HOSPADM

## 2021-08-25 RX ORDER — MISOPROSTOL 200 UG/1
400 TABLET ORAL
Status: DISCONTINUED | OUTPATIENT
Start: 2021-08-25 | End: 2021-08-27 | Stop reason: HOSPADM

## 2021-08-25 RX ORDER — KETOROLAC TROMETHAMINE 30 MG/ML
30 INJECTION, SOLUTION INTRAMUSCULAR; INTRAVENOUS
Status: DISCONTINUED | OUTPATIENT
Start: 2021-08-25 | End: 2021-08-29 | Stop reason: HOSPADM

## 2021-08-25 RX ADMIN — Medication 25 MCG: at 20:00

## 2021-08-25 RX ADMIN — Medication 25 MCG: at 22:00

## 2021-08-25 RX ADMIN — Medication 25 MCG: at 17:52

## 2021-08-25 ASSESSMENT — ACTIVITIES OF DAILY LIVING (ADL)
HEARING_DIFFICULTY_OR_DEAF: NO
TOILETING_ISSUES: NO
DIFFICULTY_COMMUNICATING: NO
DIFFICULTY_EATING/SWALLOWING: NO
CONCENTRATING,_REMEMBERING_OR_MAKING_DECISIONS_DIFFICULTY: NO
FALL_HISTORY_WITHIN_LAST_SIX_MONTHS: NO
DRESSING/BATHING_DIFFICULTY: NO
DOING_ERRANDS_INDEPENDENTLY_DIFFICULTY: NO
WALKING_OR_CLIMBING_STAIRS_DIFFICULTY: NO
PATIENT_/_FAMILY_COMMUNICATION_STYLE: SPOKEN LANGUAGE (ENGLISH OR BILINGUAL)
WEAR_GLASSES_OR_BLIND: NO

## 2021-08-25 NOTE — H&P
OBSTETRICS ADMISSION HISTORY & PHYSICAL  DATE OF ADMISSION: 2021  4:26 PM        Assessment and Plan:   Assessment:   Megan Rodriguez is a 25 year old  at 41w6d, GBS negative, intact, and presenting for induction of labor due to post date. Was previously admitted for induction 21 and was discharged to home due to not progressing. Patient previously not tolerating cervidil, will plan to start oral cytotec.   Patient Active Problem List   Diagnosis     Pregnant     Encounter for triage in pregnant patient         PLAN:   1. cervical ripening with misoprostol  2. GBS: negative. Antibiotics are notindicated   3. Comfort plan: Epidural  4. Will monitor labor progress along with RN and update attending physician    Patient discussed with attending physician, Dr. Pierce , who agrees with the plan.     Kelly Washburn MD PGY1 2021  [facility] Medicine Residency Program         Chief Complaint:     Induction of labor due to post date       History of Present Illness:     Megan Rodriguez is a 25 year old year old  at 41w6d. Patient received prenatal care with Amari Jennings at [facility].    Presents to the [facility] for induction of labor, indication post-dates.  Was admitted  for induction of labor for post-dates but was discharged after not progressing for therapeutic rest at home with plan to readmit today, 21.   She reports irregular contractions over the past few days with minimal discomfort. Denies fluid leakage. Denies bleeding per vagina. Fetal movement is normal.  Denies headache, changes in vision, abdominal pain, or swelling.    Their prenatal course has been uncomplicated .    Prenatal labs   Lab Results   Component Value Date    AS Negative 2021    HGB 11.2 (L) 2021       GBS was collected on 21- negative.              Obstetrical History:     OB History    Para Term  AB Living   1 0 0 0 0 0    SAB TAB Ectopic Multiple Live Births   0 0 0 0 0      # Outcome Date GA Lbr Fernando/2nd Weight Sex Delivery Anes PTL Lv   1 Current                       Immunzations:       There is no immunization history on file for this patient.  Tdap this pregnancy?  UNKNOWN  Flu shot this pregnancy?UNKNOWN  COVID vaccine? UNKNOWN         Past Medical History:   No past medical history on file.         Past Surgical History:   No past surgical history on file.         Family History:   No family history on file.         Social History:   no tobacco use  no alcohol use  no illicit drug use         Medications:   No current facility-administered medications on file prior to encounter.  ferrous sulfate (FEROSUL) 325 (65 Fe) MG tablet, Take 325 mg by mouth 3 times daily (with meals)  Prenatal Vit-Fe Fumarate-FA (PRENATAL MULTIVITAMIN W/IRON) 27-0.8 MG tablet, Take 1 tablet by mouth daily             Allergies:   Lactose         Review of Systems:   SKIN: no worrisome rashes or lesions  EYES: no acute vision problems or changes  ENT: no ear problems, no mouth problems, no throat problems  RESP: no significant cough, no shortness of breath  CV: no chest pain, no palpitations, no new or worsening peripheral edema  GI: no nausea, no vomiting, no constipation, no diarrhea  : no frequency, no dysuria, no hematuria  NEURO: no weakness, no dizziness, no headaches  PSYCHIATRIC: NEGATIVE for changes in mood or trouble with sleep         Physical Exam:   Vitals:   /73   Temp 98  F (36.7  C) (Oral)   Resp 19   0 lbs 0 oz  There is no height or weight on file to calculate BMI.    GEN: Awake, alert in no apparent distress   HEENT: grossly normal  RESPIRATORY: clear to auscultation bilaterally, no increased work of breathing  CARDIOVASCULAR: RRR, no murmur  EXT:  Trace edema, no calf tenderness  Confirmed VTX by Ultrasound? Yes at bedside    Electronic Fetal Monitoring:  Baseline rate normal  Variability moderate  Accelerations  present  Decelerations not present    Assessment: Category I EFM interpretation suggests absence of concern for fetal metabolic acidemia at this time due to moderate variability and accelerations present    Uterine Activity irregular.      Strip reviewed per nursing report    NST interpretation:  Baseline rate 150 normal  Accelerations present  Decelerations not present  Interpretation: reactive

## 2021-08-26 ENCOUNTER — ANESTHESIA (OUTPATIENT)
Dept: OBGYN | Facility: CLINIC | Age: 26
End: 2021-08-26
Payer: COMMERCIAL

## 2021-08-26 ENCOUNTER — ANESTHESIA EVENT (OUTPATIENT)
Dept: OBGYN | Facility: CLINIC | Age: 26
End: 2021-08-26
Payer: COMMERCIAL

## 2021-08-26 LAB — RUPTURE OF FETAL MEMBRANES BY ROM PLUS: POSITIVE

## 2021-08-26 PROCEDURE — 250N000009 HC RX 250: Performed by: ANESTHESIOLOGY

## 2021-08-26 PROCEDURE — 84112 EVAL AMNIOTIC FLUID PROTEIN: CPT | Performed by: FAMILY MEDICINE

## 2021-08-26 PROCEDURE — 00HU33Z INSERTION OF INFUSION DEVICE INTO SPINAL CANAL, PERCUTANEOUS APPROACH: ICD-10-PCS | Performed by: ANESTHESIOLOGY

## 2021-08-26 PROCEDURE — 258N000003 HC RX IP 258 OP 636

## 2021-08-26 PROCEDURE — 250N000009 HC RX 250

## 2021-08-26 PROCEDURE — 258N000003 HC RX IP 258 OP 636: Performed by: ANESTHESIOLOGY

## 2021-08-26 PROCEDURE — 120N000001 HC R&B MED SURG/OB

## 2021-08-26 PROCEDURE — 370N000003 HC ANESTHESIA WARD SERVICE

## 2021-08-26 PROCEDURE — 3E0R3BZ INTRODUCTION OF ANESTHETIC AGENT INTO SPINAL CANAL, PERCUTANEOUS APPROACH: ICD-10-PCS | Performed by: ANESTHESIOLOGY

## 2021-08-26 PROCEDURE — 250N000011 HC RX IP 250 OP 636: Performed by: ANESTHESIOLOGY

## 2021-08-26 PROCEDURE — 250N000013 HC RX MED GY IP 250 OP 250 PS 637

## 2021-08-26 RX ORDER — SODIUM CHLORIDE, SODIUM LACTATE, POTASSIUM CHLORIDE, CALCIUM CHLORIDE 600; 310; 30; 20 MG/100ML; MG/100ML; MG/100ML; MG/100ML
INJECTION, SOLUTION INTRAVENOUS CONTINUOUS
Status: DISCONTINUED | OUTPATIENT
Start: 2021-08-27 | End: 2021-08-27 | Stop reason: HOSPADM

## 2021-08-26 RX ORDER — LIDOCAINE 40 MG/G
CREAM TOPICAL
Status: DISCONTINUED | OUTPATIENT
Start: 2021-08-26 | End: 2021-08-27 | Stop reason: HOSPADM

## 2021-08-26 RX ORDER — EPHEDRINE SULFATE 50 MG/ML
5 INJECTION, SOLUTION INTRAMUSCULAR; INTRAVENOUS; SUBCUTANEOUS
Status: DISCONTINUED | OUTPATIENT
Start: 2021-08-26 | End: 2021-08-27

## 2021-08-26 RX ORDER — BUPIVACAINE HYDROCHLORIDE 2.5 MG/ML
INJECTION, SOLUTION EPIDURAL; INFILTRATION; INTRACAUDAL
Status: COMPLETED | OUTPATIENT
Start: 2021-08-26 | End: 2021-08-27

## 2021-08-26 RX ORDER — NALBUPHINE HYDROCHLORIDE 10 MG/ML
2.5-5 INJECTION, SOLUTION INTRAMUSCULAR; INTRAVENOUS; SUBCUTANEOUS EVERY 6 HOURS PRN
Status: DISCONTINUED | OUTPATIENT
Start: 2021-08-26 | End: 2021-08-29 | Stop reason: HOSPADM

## 2021-08-26 RX ORDER — FENTANYL/BUPIVACAINE/NS/PF 2-1250MCG
10 PLASTIC BAG, INJECTION (ML) INJECTION CONTINUOUS
Status: DISCONTINUED | OUTPATIENT
Start: 2021-08-26 | End: 2021-08-27

## 2021-08-26 RX ORDER — OXYTOCIN/0.9 % SODIUM CHLORIDE 30/500 ML
1-24 PLASTIC BAG, INJECTION (ML) INTRAVENOUS CONTINUOUS
Status: DISCONTINUED | OUTPATIENT
Start: 2021-08-26 | End: 2021-08-27 | Stop reason: HOSPADM

## 2021-08-26 RX ADMIN — Medication 25 MCG: at 00:00

## 2021-08-26 RX ADMIN — SODIUM CHLORIDE, POTASSIUM CHLORIDE, SODIUM LACTATE AND CALCIUM CHLORIDE 1000 ML: 600; 310; 30; 20 INJECTION, SOLUTION INTRAVENOUS at 16:33

## 2021-08-26 RX ADMIN — Medication 25 MCG: at 06:30

## 2021-08-26 RX ADMIN — Medication 5 MG: at 21:19

## 2021-08-26 RX ADMIN — Medication 2 MILLI-UNITS/MIN: at 16:34

## 2021-08-26 RX ADMIN — LIDOCAINE HYDROCHLORIDE,EPINEPHRINE BITARTRATE 3 ML: 15; .005 INJECTION, SOLUTION EPIDURAL; INFILTRATION; INTRACAUDAL; PERINEURAL at 20:11

## 2021-08-26 RX ADMIN — Medication 25 MCG: at 08:31

## 2021-08-26 RX ADMIN — Medication 5 MG: at 22:17

## 2021-08-26 RX ADMIN — BUPIVACAINE HYDROCHLORIDE 8 ML: 2.5 INJECTION, SOLUTION EPIDURAL; INFILTRATION; INTRACAUDAL at 20:14

## 2021-08-26 RX ADMIN — Medication 25 MCG: at 10:28

## 2021-08-26 RX ADMIN — BUPIVACAINE HYDROCHLORIDE 7 ML: 2.5 INJECTION, SOLUTION EPIDURAL; INFILTRATION; INTRACAUDAL at 22:58

## 2021-08-26 RX ADMIN — Medication 10 ML/HR: at 20:14

## 2021-08-26 RX ADMIN — Medication 25 MCG: at 12:39

## 2021-08-26 RX ADMIN — Medication 25 MCG: at 02:00

## 2021-08-26 RX ADMIN — Medication 5 MG: at 21:31

## 2021-08-26 RX ADMIN — SODIUM CHLORIDE, POTASSIUM CHLORIDE, SODIUM LACTATE AND CALCIUM CHLORIDE 500 ML: 600; 310; 30; 20 INJECTION, SOLUTION INTRAVENOUS at 19:30

## 2021-08-26 RX ADMIN — SODIUM CHLORIDE, POTASSIUM CHLORIDE, SODIUM LACTATE AND CALCIUM CHLORIDE 250 ML: 600; 310; 30; 20 INJECTION, SOLUTION INTRAVENOUS at 20:29

## 2021-08-26 RX ADMIN — Medication 25 MCG: at 14:34

## 2021-08-26 RX ADMIN — Medication 25 MCG: at 04:30

## 2021-08-26 RX ADMIN — Medication 5 MG: at 22:11

## 2021-08-26 NOTE — PLAN OF CARE
Problem: Labor Pain (Labor)  Goal: Acceptable Pain Control  Outcome: Improving     Vital signs stable. Pt reports intermittent contractions, rating them as mild-moderate. Overall pain well managed overnight and pt able to rest comfortably between cares.

## 2021-08-26 NOTE — PLAN OF CARE
Updated Dr. Pierce that patient is SROM.  Looks like clear fluid.  We are going to continue with cytotec till 430 and then start pitocin.  Updated patient of the news and she is good with plan also.

## 2021-08-26 NOTE — PROGRESS NOTES
FM/OB Antepartum Note    A: IUP at 42 weeks with good dating      Here for cervical ripening for post dates    P: Continue Cytotec through mid afternoon. At that point I would like to do low dose pitocin beginning between 3:30-4:30 unless she is actively litzy. We tried this tactic a week ago and she is aware of th risks of pitocin.    S: Doing well. Some ct. No HA's or blurred vision.    O: VSS       Abdomen gravid. EFW~ 7 1/2#       FHT's 150's with moderate variability, ? Variable decelerations at times    Kevin Pierce MD  [facility]

## 2021-08-26 NOTE — PROGRESS NOTES
Labor Progress Note    Assessment/Plan  25 year old  at 42w0d gestational age, GBS negative admitted in induction of labor due to post date. SROM with clear fluid. Currently on oral cytotec and tolerating well, plan to receive 11th dose and then begin pitocin. Category I tracing and VSS reassuring.     - Continue to monitor FHR  - Anticipate   - Give 11th dose of cytotec and then discontinue  - After cytotec discontinued, will begin low dose pit   - Plan for epidural once patient reports high discomfort     Subjective  Per nursing report, patient is feeling contractions more and is more uncomfortable. Doing well overall.       Objective  Vital signs in last 24 hours  Temp:  [97.7  F (36.5  C)-98.2  F (36.8  C)] 98.2  F (36.8  C)  Pulse:  [72-79] 72  Resp:  [15-19] 16  BP: ()/(53-84) 125/84  SpO2:  [98 %-99 %] 98 %      Physical Exam  FHR: Baseline 145/moderate variability/ accels/no decels; Category I tracing  [place]: Contractions Q 3-5 min    Pertinent Labs   Lab Results   Component Value Date    ABORH A POS 2021    HGB 11.2 2021        Patient discussed with attending physician, Dr. Pierce , who agrees with the plan.     Kelly Washburn MD PGY1 2021  [facility]

## 2021-08-26 NOTE — PROGRESS NOTES
Labor Progress Note    Assessment/Plan  25 year old  at 42w0d gestational age, GBS negative, intact membranes admitted in induction of labor due to post date. Currently on oral cytotec and tolerating well. Category I tracing and VSS reassuring. Plan to reassess after finished cytotec course.     - Continue to monitor FHR  - Anticipate   - Reassess after cytotec. Can consider pitocin  - Patient eventually plans for an epidural    Subjective  Doing well overall, slept comfortably. Notes she is feeling contractions a bit more, but is still comfortable. Reports no headache, vision changes, chest pain, SOB. No questions or concerns at this time. It's patient's mom's birthday today.       Objective  Vital signs in last 24 hours  Temp:  [97.7  F (36.5  C)-98.1  F (36.7  C)] 97.7  F (36.5  C)  Pulse:  [79] 79  Resp:  [15-19] 15  BP: ()/(53-73) 110/68      Physical Exam  General: Awake, sitting comfortably in bed.   FHR: Baseline 150/moderate variability/ accels/no decels; Category I tracing  [place]: Contractions Q 2-4 min    Pertinent Labs   Lab Results   Component Value Date    ABORH A POS 2021    HGB 11.2 2021        Patient discussed with attending physician, Dr. Pierce , who agrees with the plan.     Kelly Washburn MD PGY1 2021  [facility]

## 2021-08-26 NOTE — PLAN OF CARE
Talked to Dr. Pierce for update on how patient is doing.  Coping well.  Started pitocin at 430 pm like planned.  We are good to get epidural whenever patient is ready.  Patient is tolerating pitocin at this time.  Patient is moving around in room and changing positions to help get more into labor.  We are to call Dr. Pierce by 1000 pm if we do not hear from him sooner.

## 2021-08-27 PROCEDURE — 250N000013 HC RX MED GY IP 250 OP 250 PS 637

## 2021-08-27 PROCEDURE — 258N000003 HC RX IP 258 OP 636: Performed by: ANESTHESIOLOGY

## 2021-08-27 PROCEDURE — 250N000009 HC RX 250

## 2021-08-27 PROCEDURE — 250N000011 HC RX IP 250 OP 636: Performed by: ANESTHESIOLOGY

## 2021-08-27 PROCEDURE — 0KQM0ZZ REPAIR PERINEUM MUSCLE, OPEN APPROACH: ICD-10-PCS | Performed by: FAMILY MEDICINE

## 2021-08-27 PROCEDURE — 120N000001 HC R&B MED SURG/OB

## 2021-08-27 PROCEDURE — 722N000001 HC LABOR CARE VAGINAL DELIVERY SINGLE

## 2021-08-27 PROCEDURE — 258N000003 HC RX IP 258 OP 636: Performed by: FAMILY MEDICINE

## 2021-08-27 PROCEDURE — 250N000011 HC RX IP 250 OP 636

## 2021-08-27 RX ORDER — OXYTOCIN 10 [USP'U]/ML
10 INJECTION, SOLUTION INTRAMUSCULAR; INTRAVENOUS
Status: DISCONTINUED | OUTPATIENT
Start: 2021-08-27 | End: 2021-08-29 | Stop reason: HOSPADM

## 2021-08-27 RX ORDER — BISACODYL 10 MG
10 SUPPOSITORY, RECTAL RECTAL DAILY PRN
Status: DISCONTINUED | OUTPATIENT
Start: 2021-08-27 | End: 2021-08-29 | Stop reason: HOSPADM

## 2021-08-27 RX ORDER — MISOPROSTOL 200 UG/1
800 TABLET ORAL
Status: DISCONTINUED | OUTPATIENT
Start: 2021-08-27 | End: 2021-08-29 | Stop reason: HOSPADM

## 2021-08-27 RX ORDER — EPHEDRINE SULFATE 50 MG/ML
5 INJECTION, SOLUTION INTRAMUSCULAR; INTRAVENOUS; SUBCUTANEOUS
Status: DISCONTINUED | OUTPATIENT
Start: 2021-08-27 | End: 2021-08-27 | Stop reason: HOSPADM

## 2021-08-27 RX ORDER — METHYLERGONOVINE MALEATE 0.2 MG/ML
200 INJECTION INTRAVENOUS
Status: DISCONTINUED | OUTPATIENT
Start: 2021-08-27 | End: 2021-08-29 | Stop reason: HOSPADM

## 2021-08-27 RX ORDER — CARBOPROST TROMETHAMINE 250 UG/ML
250 INJECTION, SOLUTION INTRAMUSCULAR
Status: DISCONTINUED | OUTPATIENT
Start: 2021-08-27 | End: 2021-08-29 | Stop reason: HOSPADM

## 2021-08-27 RX ORDER — DOCUSATE SODIUM 100 MG/1
100 CAPSULE, LIQUID FILLED ORAL DAILY
Status: DISCONTINUED | OUTPATIENT
Start: 2021-08-27 | End: 2021-08-29 | Stop reason: HOSPADM

## 2021-08-27 RX ORDER — LIDOCAINE HYDROCHLORIDE AND EPINEPHRINE 15; 5 MG/ML; UG/ML
INJECTION, SOLUTION EPIDURAL PRN
Status: DISCONTINUED | OUTPATIENT
Start: 2021-08-27 | End: 2021-08-27

## 2021-08-27 RX ORDER — HYDROCORTISONE 2.5 %
CREAM (GRAM) TOPICAL 3 TIMES DAILY PRN
Status: DISCONTINUED | OUTPATIENT
Start: 2021-08-27 | End: 2021-08-29 | Stop reason: HOSPADM

## 2021-08-27 RX ORDER — OXYTOCIN/0.9 % SODIUM CHLORIDE 30/500 ML
340 PLASTIC BAG, INJECTION (ML) INTRAVENOUS CONTINUOUS PRN
Status: DISCONTINUED | OUTPATIENT
Start: 2021-08-27 | End: 2021-08-29 | Stop reason: HOSPADM

## 2021-08-27 RX ORDER — ACETAMINOPHEN 325 MG/1
650 TABLET ORAL EVERY 4 HOURS PRN
Status: DISCONTINUED | OUTPATIENT
Start: 2021-08-27 | End: 2021-08-29 | Stop reason: HOSPADM

## 2021-08-27 RX ORDER — MODIFIED LANOLIN
OINTMENT (GRAM) TOPICAL
Status: DISCONTINUED | OUTPATIENT
Start: 2021-08-27 | End: 2021-08-29 | Stop reason: HOSPADM

## 2021-08-27 RX ORDER — IBUPROFEN 800 MG/1
800 TABLET, FILM COATED ORAL EVERY 6 HOURS PRN
Status: DISCONTINUED | OUTPATIENT
Start: 2021-08-27 | End: 2021-08-29 | Stop reason: HOSPADM

## 2021-08-27 RX ORDER — MISOPROSTOL 200 UG/1
400 TABLET ORAL
Status: DISCONTINUED | OUTPATIENT
Start: 2021-08-27 | End: 2021-08-29 | Stop reason: HOSPADM

## 2021-08-27 RX ADMIN — IBUPROFEN 800 MG: 800 TABLET, FILM COATED ORAL at 23:41

## 2021-08-27 RX ADMIN — KETOROLAC TROMETHAMINE 30 MG: 30 INJECTION, SOLUTION INTRAMUSCULAR; INTRAVENOUS at 17:20

## 2021-08-27 RX ADMIN — SODIUM CHLORIDE, POTASSIUM CHLORIDE, SODIUM LACTATE AND CALCIUM CHLORIDE 500 ML: 600; 310; 30; 20 INJECTION, SOLUTION INTRAVENOUS at 00:01

## 2021-08-27 RX ADMIN — MISOPROSTOL 800 MCG: 200 TABLET ORAL at 16:58

## 2021-08-27 RX ADMIN — LIDOCAINE HYDROCHLORIDE 20 ML: 10 INJECTION, SOLUTION INFILTRATION; PERINEURAL at 16:58

## 2021-08-27 RX ADMIN — BUPIVACAINE HYDROCHLORIDE 10 ML: 2.5 INJECTION, SOLUTION EPIDURAL; INFILTRATION; INTRACAUDAL at 08:24

## 2021-08-27 RX ADMIN — SODIUM CHLORIDE, POTASSIUM CHLORIDE, SODIUM LACTATE AND CALCIUM CHLORIDE 125 ML/HR: 600; 310; 30; 20 INJECTION, SOLUTION INTRAVENOUS at 14:44

## 2021-08-27 RX ADMIN — SODIUM CHLORIDE, POTASSIUM CHLORIDE, SODIUM LACTATE AND CALCIUM CHLORIDE 125 ML/HR: 600; 310; 30; 20 INJECTION, SOLUTION INTRAVENOUS at 09:05

## 2021-08-27 RX ADMIN — Medication 12 ML/HR: at 04:42

## 2021-08-27 RX ADMIN — DOCUSATE SODIUM 100 MG: 100 CAPSULE, LIQUID FILLED ORAL at 22:22

## 2021-08-27 RX ADMIN — SODIUM CHLORIDE, POTASSIUM CHLORIDE, SODIUM LACTATE AND CALCIUM CHLORIDE 500 ML: 600; 310; 30; 20 INJECTION, SOLUTION INTRAVENOUS at 08:33

## 2021-08-27 RX ADMIN — BUPIVACAINE HYDROCHLORIDE 8 ML: 2.5 INJECTION, SOLUTION EPIDURAL; INFILTRATION; INTRACAUDAL at 01:01

## 2021-08-27 RX ADMIN — Medication: at 12:48

## 2021-08-27 RX ADMIN — LIDOCAINE HYDROCHLORIDE,EPINEPHRINE BITARTRATE 3 ML: 15; .005 INJECTION, SOLUTION EPIDURAL; INFILTRATION; INTRACAUDAL; PERINEURAL at 00:59

## 2021-08-27 RX ADMIN — BUPIVACAINE HYDROCHLORIDE 10 ML: 2.5 INJECTION, SOLUTION EPIDURAL; INFILTRATION; INTRACAUDAL at 00:43

## 2021-08-27 RX ADMIN — ACETAMINOPHEN 650 MG: 325 TABLET ORAL at 22:20

## 2021-08-27 RX ADMIN — SODIUM CHLORIDE, POTASSIUM CHLORIDE, SODIUM LACTATE AND CALCIUM CHLORIDE: 600; 310; 30; 20 INJECTION, SOLUTION INTRAVENOUS at 04:40

## 2021-08-27 NOTE — ANESTHESIA PROCEDURE NOTES
Epidural catheter Procedure Note  Pre-Procedure   Staff -        Anesthesiologist:  Rojas Aranda MD       Performed By: anesthesiologist       Location: OB       Procedure Start/Stop Times: 8/26/2021 8:02 PM and 8/26/2021 8:18 PM       Pre-Anesthestic Checklist: patient identified, IV checked, risks and benefits discussed, informed consent, monitors and equipment checked, pre-op evaluation, at physician/surgeon's request and post-op pain management  Timeout:       Correct Patient: Yes        Correct Procedure: Yes        Correct Site: Yes        Correct Position: Yes   Procedure Documentation  Procedure: epidural catheter       Patient Position: LLD       Skin prep: Chloraprep       Local skin infiltrated with 3 mL of 1% lidocaine.        Insertion Site: L3-4. (midline approach).       Technique: LORT saline        Needle Type: Stamped       Needle Gauge: 18.        Needle Length (Inches): 3.5        Catheter: 20 G.         Catheter threaded easily.        # of attempts: 1 and  # of redirects:     Assessment/Narrative         Paresthesias: No.      Test dose of 3 mL lidocaine 1.5% w/ 1:200,000 epinephrine at 20:11 CDT.        .       Insertion/Infusion Method: LORT saline       Aspiration negative for Heme or CSF via Epidural Catheter.    Medication(s) Administered   0.25% Bupivacaine PF (Epidural), 8 mL  Medication Administration Time: 8/26/2021 8:14 PM

## 2021-08-27 NOTE — ANESTHESIA PREPROCEDURE EVALUATION
Anesthesia Pre-Procedure Evaluation    Patient: Megan Rodriguez   MRN: [id number] : 1995        Preoperative Diagnosis: * No surgery found *   Procedure :      History reviewed. No pertinent past medical history.   History reviewed. No pertinent surgical history.   Allergies   Allergen Reactions     Lactose       Social History     Tobacco Use     Smoking status: Never Smoker     Smokeless tobacco: Never Used   Substance Use Topics     Alcohol use: Not Currently      Wt Readings from Last 1 Encounters:   No data found for Wt        Anesthesia Evaluation   Pt has not had prior anesthetic         ROS/MED HX  ENT/Pulmonary:  - neg pulmonary ROS     Neurologic:  - neg neurologic ROS     Cardiovascular:  - neg cardiovascular ROS     METS/Exercise Tolerance:     Hematologic:  - neg hematologic  ROS     Musculoskeletal:  - neg musculoskeletal ROS     GI/Hepatic:  - neg GI/hepatic ROS     Renal/Genitourinary:  - neg Renal ROS     Endo:  - neg endo ROS     Psychiatric/Substance Use:  - neg psychiatric ROS     Infectious Disease:  - neg infectious disease ROS     Malignancy:  - neg malignancy ROS     Other:      (+) Possibly pregnant, ,         Physical Exam    Airway        Mallampati: I    Neck ROM: full     Respiratory Devices and Support         Dental           Cardiovascular             Pulmonary                   OUTSIDE LABS:  CBC:   Lab Results   Component Value Date    HGB 11.2 (L) 2021     BMP: No results found for: NA, POTASSIUM, CHLORIDE, CO2, BUN, CR, GLC  COAGS: No results found for: PTT, INR, FIBR  POC: No results found for: BGM, HCG, HCGS  HEPATIC: No results found for: ALBUMIN, PROTTOTAL, ALT, AST, GGT, ALKPHOS, BILITOTAL, BILIDIRECT, EDDIE  OTHER: No results found for: PH, LACT, A1C, JAVED, PHOS, MAG, LIPASE, AMYLASE, TSH, T4, T3, CRP, SED    Anesthesia Plan    ASA Status:  2      Anesthesia Type: Epidural.              Consents    Anesthesia Plan(s) and associated risks, benefits, and realistic  alternatives discussed. Questions answered and patient/representative(s) expressed understanding.     - Discussed with:  Patient         Postoperative Care            Comments:                Rojas Aranda MD

## 2021-08-27 NOTE — PROGRESS NOTES
Labor Progress Note    Assessment/Plan  25 year old  at 42w1d gestational age admitted for IOL for postdates. Pain improved with epidural bolus. Category 1 tracing and vital signs stable. Induced initially with cytotec. Currently on pitocin 8 ml/hr. Cervical exam 10/100/+1 at 11am today.    - Continue to monitor FHR  - Anticipate   - continue pitocin  - Complete, will have patient labor down     Subjective  Patient doing well. Pain improved with epidural bolus, cervical exam was tolerated.       Objective  Vital signs in last 24 hours  Temp:  [97.5  F (36.4  C)-98.5  F (36.9  C)] 97.9  F (36.6  C)  Pulse:  [71-82] 71  Resp:  [16-19] 19  BP: ()/(50-84) 102/61  SpO2:  [90 %-100 %] 93 %      Physical Exam  General:   Abd: Gravid, soft, nontender  Cervix: 10cm, 100% effaced, +1 station  FHR: Baseline 140/moderate variability/ accels present/no decels; Category 1 tracing  [place]: Contractions Q 3-4 min  Extremities: no peripheral edema    Pertinent Labs   Lab Results   Component Value Date    ABORH A POS 2021    HGB 11.2 2021            Patient discussed with attending physician, Dr. Kevin Pierce , who agrees with the plan.     Rabia Muñoz MD PGY1 2021  [facility]

## 2021-08-27 NOTE — PROVIDER NOTIFICATION
08/26/21 4907   Epidural Placement Care   Epidural Request/Placement provider notified for redose     CRNA updated that patient was having breakthrough pain with contractions.  CRNA updated on patient's blood pressures, mid 90s-108/50-60s, unable to keep above 100/60 with fluid bolus and ephedrine.  CRNA verbalizes if patient is otherwise asymptomatic  that is okay and continue to monitor per protocol. Will be up at bedside to administer bolus and change rate.     Sonja Ghosh RN

## 2021-08-27 NOTE — PROGRESS NOTES
Writer called Dr. Pierce due to increased baseline for FHT.  MD told writer to restart oxytocin and ok to give fluid bolus.

## 2021-08-27 NOTE — PROGRESS NOTES
FM/OB Antepartum Note     A: IUP at 42+1 weeks with good dating      In labor with a labor epidural     P: She is in active labor now. Discussed the use of pitocin as needed. Need to work on relaxing. We may have to consider other forms of anesthesia during the second stage or re-positioning the epidural.     S: Doing well. Some ct. No HA's or blurred vision. Despite epidural she has pain with exam     O: VSS       Abdomen gravid. EFW~ 7 1/2#       Cervix: 6/ vtx/ -1 /90%       FHT's 130's with moderate variability, ? Variable decelerations at times     Kevin Pierce MD  [facility]

## 2021-08-27 NOTE — PROVIDER NOTIFICATION
08/26/21 2236   Provider Notification   Provider Name/Title Dr. Pierce    Method of Notification Phone   Request Evaluate-Remote   Notification Reason Status Update     MD updated on patient's status.  Pt litzy every 2-3 minutes, late decelerations noted.  Pitocin stopped, fluid bolus started, and position change corrected FHR.  Pt continues to contract every 2-3 minutes at this time.  Cervical exam 4-5 cm/70/-1. Dr. Pierce verbalizes to continue monitoring, if contractions decrease to restart pitocin.  RN to update provider if needed.     Sonja Ghosh, RN

## 2021-08-27 NOTE — L&D DELIVERY NOTE
[facility] Delivery Summary  [facility]  Date of Service: 2021    Name      Megan Rodriguez         1995  MRN       [id number]  PCP        Amari Pierce     DELIVERY NARRATIVE    On 2021 Megan Rodriguez delivered a viable female infant at 42w1d with apgars of  and  via vaginal  Delivery.    Megan presented to Maternity Care on 2021 with induction. Her group B Strep (GBS) carrier status was negative. She received misoprostol  For ripening ad oxytocin.    Delivery was via   to a sterile field under epidural  anesthesia. Infant delivered in         position. Shoulders delivered without difficulty. The baby was placed on the patient's abdomen.  Cord complications:  . Delayed cord clamping was performed.  The cord was doubly clamped and cut   were noted.     Placenta delivered at  . Placental disposition was  . Fundal massage performed and fundus found to be  firm. The following uterotonics were given: Pitocin (IV) and Misoprostol (rectal) 800 mcg. Perineum, vagina, cervix were inspected, and the following lacerations were noted: The following lacerations were present: 2nd degree perineal, periurethral  and vaginal. Lacerations were repaired in the usual fashion using 3-0 Chromic.. QBL: 400 mL.    Excellent hemostasis was noted. Needle and sponge count correct. Infant and patient in delivery room in good and stable condition.   _________________    GA: 42w1d  GP:   Labor Complications:    Additional Complications:    QBL:    Delivery Type:    Duration of Ruptured Membranes: 29h 03m  GBS Status:   No results found for: GBPCRT    Weight: 3.47 kg (7 lb 10.4 oz)   Apgar scores:  ,          Chrissy Rodriguez-Megan [[id number]]    Labor Event Times    Labor onset date: 21 Onset time:  7:00 PM   Dilation complete date: 21 Complete time: 11:12 AM   Start pushing date/time: 2021 1258      Labor Events     labor?:  "No   steroids: None  Labor Type: Spontaneous  Predominate monitoring during 1st stage: continuous electronic fetal monitoring     Antibiotics received during labor?: No     Rupture identifier: Sac 1  Rupture date/time: 21 1100   Rupture type: Spontaneous rupture of membranes occuring during spontaneous labor or augmentation  Fluid color: Clear     Induction: Misoprostol  Induction date/time:     Cervical ripening date/time:     Indications for induction: Post-term Gestation     Augmentation: Oxytocin  1:1 continuous labor support provided by?: RN       Delivery/Placenta Date and Time    Delivery Date: 21 Delivery Time:  4:03 PM           Vaginal Counts     Initial count performed by 2 team members:  Two Team Members   Dr Stan Briones RN       Needles Suture Needles Sponges (RETIRED) Instruments   Initial counts       Added to count       Relief counts       Final counts             Placed during labor Accounted for at the end of labor   FSE     IUPC     Cervadil                Final count performed by 2 team members:  Two Team Members   Dr Stan Briones RN          Measurements    Weight: 7 lb 10.4 oz Length: 1' 8.47\"   Head circumference: 35 cm       Skin to Skin and Feeding Plan    Skin to skin initiation date/time:     Skin to skin with: Mother  Skin to skin end date/time:        Delivery (Maternal) (Provider to Complete) ([id number])       Blood Loss  Mother: Megan Rodriguez #[id number]   Start of Mother's Information    Delivery Blood Loss  21 1900 - 21 1752    None           End of Mother's Information  Mother: Megan Rodriguez #[id number]          Anesthesia    Method: Epidural                       Completed by:   Amari Pierce MD, MD  [facility]  2021 5:52 PM  "

## 2021-08-28 LAB — HGB BLD-MCNC: 8 G/DL (ref 11.7–15.7)

## 2021-08-28 PROCEDURE — 36415 COLL VENOUS BLD VENIPUNCTURE: CPT

## 2021-08-28 PROCEDURE — 120N000001 HC R&B MED SURG/OB

## 2021-08-28 PROCEDURE — 250N000013 HC RX MED GY IP 250 OP 250 PS 637

## 2021-08-28 PROCEDURE — 85018 HEMOGLOBIN: CPT

## 2021-08-28 RX ADMIN — DOCUSATE SODIUM 100 MG: 100 CAPSULE, LIQUID FILLED ORAL at 08:04

## 2021-08-28 RX ADMIN — IBUPROFEN 800 MG: 800 TABLET, FILM COATED ORAL at 21:46

## 2021-08-28 RX ADMIN — ACETAMINOPHEN 650 MG: 325 TABLET ORAL at 02:53

## 2021-08-28 RX ADMIN — IBUPROFEN 800 MG: 800 TABLET, FILM COATED ORAL at 14:05

## 2021-08-28 RX ADMIN — ACETAMINOPHEN 650 MG: 325 TABLET ORAL at 23:54

## 2021-08-28 RX ADMIN — IBUPROFEN 800 MG: 800 TABLET, FILM COATED ORAL at 07:56

## 2021-08-28 RX ADMIN — ACETAMINOPHEN 650 MG: 325 TABLET ORAL at 19:45

## 2021-08-28 RX ADMIN — HYDROCORTISONE: 25 CREAM TOPICAL at 02:49

## 2021-08-28 RX ADMIN — ACETAMINOPHEN 650 MG: 325 TABLET ORAL at 11:00

## 2021-08-28 NOTE — PLAN OF CARE
Problem: Adult Inpatient Plan of Care  Goal: Plan of Care Review  Outcome: Improving     Problem: Adult Inpatient Plan of Care  Goal: Patient-Specific Goal (Individualized)  Outcome: Improving     Patient with stable vital signs and is hesitant to ambulate secondary to rectal pain. RN completed a full perineal and vaginal assessment and patient did not have any area of purple swelling or tenderness but a pea size hemorrhoid was noted, Tucks pads to rectum along with hydrocortisone cream as ordered was placed on with a decrease in pain. Ice to perineum for swelling was placed. Vital signs checked frequently and found to be WNL. Patient was eager to ask questions and encouragement for self care activities were provided as appropriate for patient condition. Fundus is firm and bleeding is light. Ba Montero RN

## 2021-08-28 NOTE — PROGRESS NOTES
Maternal Postpartum Progress Note  [facility]  Date of Service: 2021    Name      Megan Rodriguez         1995  MRN       [id number]  PCP        Amari Pierce        Subjective:  The patient feels well:  Voiding with some pain, lochia normal, tolerating normal diet, and passing flatus.  Pain is reasonably well controlled with current medications.  The patient has no emotional concerns.  No calf pain or swelling.  She has some pelvic discomfort and well as perineal discomfort and a hemorrhoid. The baby is well and being fed by breast.    Objective:  /67   Pulse 95   Temp 97.6  F (36.4  C) (Oral)   Resp 20   SpO2 97%   Breastfeeding Unknown   Lochia is minimal.  The uterine fundus is firm at umbilicus, -2.  Urinary output is adequate. No calf tenderness.  No edema.    Labs:  Hemoglobin   Date Value Ref Range Status   2021 11.2 (L) 11.7 - 15.7 g/dL Final       Assessment:    -Postpartum Day 1 s/p vaginal delivery  -Normal postpartum course.    Check hemoglobin    Plan:    -Continue current care.  -Mag SO4 pads    Completed by:   Amari Pierce MD, MD, M.D.  [facility]  2021 7:34 AM

## 2021-08-28 NOTE — PROGRESS NOTES
Pt up to bathroom with stand by assist from mother. Voided, hemorrhoid cream applied and cold pack applied to perineum.

## 2021-08-28 NOTE — ANESTHESIA POSTPROCEDURE EVALUATION
Patient: Megan Rodriguez    * No procedures listed *    Diagnosis:* No pre-op diagnosis entered *  Diagnosis Additional Information: No value filed.    Anesthesia Type:  Epidural    Note:  Disposition: Inpatient   Postop Pain Control: Uneventful            Sign Out: Well controlled pain   PONV: No   Neuro/Psych: Uneventful            Sign Out: Acceptable/Baseline neuro status   Airway/Respiratory: Uneventful            Sign Out: Acceptable/Baseline resp. status   CV/Hemodynamics: Uneventful            Sign Out: Acceptable CV status; No obvious hypovolemia; No obvious fluid overload   Other NRE: NONE   DID A NON-ROUTINE EVENT OCCUR? No           Last vitals:  Vitals Value Taken Time   BP     Temp     Pulse     Resp     SpO2         Electronically Signed By: ASHLIE POTTER MD  August 28, 2021  12:00 AM

## 2021-08-28 NOTE — LACTATION NOTE
This note was copied from a baby's chart.  Rn requests assistance with breasts feeding.  This is Megan's first baby and she's been latching, but only getting a shallow latch.  She has sore nipples that have a red crease bilaterally.  They have been supplementing baby after breastfeeding each feeding.  With assistance in laid back on the left side, baby was able to latch.  Latch was pinchy, upper lip was tucked, showed mom how to flange upper lip with increased comfort.  Baby fed on the left for 10 minutes, comfortable latch and audible swallows noted.  Baby came off on her own, mom burped her and switched to right side in laid back.  Using the nipple shield, I showed mom how to properly apply it and she did a return demo.  I assisted with latching, baby had her upper lip tucked again and again showed mom how to flange it.  Latch was comfortable, audible swallows every 2-4 sucks.  We reviewed feeding log and how to record feedings and voids and stools.  We also discussed output goals for days of life.  We reviewed lactation resources in education folder; OP Lactation clinic, online resources and FE Virtual clinic.  I issued her a Spectra breast pump through her insurance and had her take pictures of the spectra cheat sheet from the Breast feeding Essentials book.  I also gave her comfortgels, she has her own Motherlove nipple cream and breast shells for nipple pain and instructed on use.  Will follow up as needed.

## 2021-08-28 NOTE — PLAN OF CARE
Ambulated with one person stand by assist to toilet, azeb care provided, bed changed, patient unable to void and strongly encouraged straight cath as labia noted as swollen but patient refused and stated she will try to get up to void in one hour. No sensation to pee at this time, fundus firm and bleeding light to scant. Ba Montero, RN

## 2021-08-29 VITALS
OXYGEN SATURATION: 98 % | DIASTOLIC BLOOD PRESSURE: 60 MMHG | SYSTOLIC BLOOD PRESSURE: 104 MMHG | HEART RATE: 88 BPM | TEMPERATURE: 97.8 F | RESPIRATION RATE: 16 BRPM

## 2021-08-29 PROBLEM — Z37.9 NORMAL LABOR: Status: RESOLVED | Noted: 2021-08-25 | Resolved: 2021-08-29

## 2021-08-29 PROBLEM — Z36.89 ENCOUNTER FOR TRIAGE IN PREGNANT PATIENT: Status: RESOLVED | Noted: 2021-08-25 | Resolved: 2021-08-29

## 2021-08-29 PROCEDURE — 250N000013 HC RX MED GY IP 250 OP 250 PS 637

## 2021-08-29 RX ADMIN — IBUPROFEN 800 MG: 800 TABLET, FILM COATED ORAL at 03:56

## 2021-08-29 RX ADMIN — IBUPROFEN 800 MG: 800 TABLET, FILM COATED ORAL at 11:37

## 2021-08-29 RX ADMIN — ACETAMINOPHEN 650 MG: 325 TABLET ORAL at 06:02

## 2021-08-29 RX ADMIN — BENZOCAINE AND LEVOMENTHOL: 200; 5 SPRAY TOPICAL at 11:35

## 2021-08-29 RX ADMIN — DOCUSATE SODIUM 100 MG: 100 CAPSULE, LIQUID FILLED ORAL at 11:34

## 2021-08-29 RX ADMIN — ACETAMINOPHEN 650 MG: 325 TABLET ORAL at 11:34

## 2021-08-29 NOTE — DISCHARGE SUMMARY
Maternal Discharge Summary  [facility]  Date of Service: 2021    Name      Megan Rodriguez         1995  MRN       [id number]  PCP        Amari Pierce    Admit Date:  2021  Discharge Date:  2021    Principal Diagnosis:    Patient Active Problem List   Diagnosis     Pregnant     Vaginal delivery     Postpartum care and examination of lactating mother       Delivery Type: vaginal, spontaneous     Hospital Course:  Megan Rodriguez is a 25 year old now  s/p vaginal, spontaneous  at 42w1d on 21.  The patient's hospital course was remarkable for vaginal pain from lacerations and baseline vaginismus.  She recovered as anticipated and experienced no post-delivery complications. Hgb On discharge was 8.0, her pain was well controlled.  Vaginal bleeding is normal.  Voiding without difficulty.  Ambulating well and tolerating a normal diet.  No fevers.  t      Conditions complicating Pregnancy:  Other Complications/Significant Findings:  post dates    Procedure(s) Performed:  and complex laceration repair    Discharge Plan:   1. Discharge to Home. Condition at Discharge:  stable  2. Physical activity: Regular.  3. Diet:  Regular.  4. Home care nurse: not indicated.  5. Contraception plan: undecided, will follow up at postpartum visit.  6. Follow up with Amari Jennings in 4 weeks.    Discharge Medications:   Current Discharge Medication List      CONTINUE these medications which have NOT CHANGED    Details   ferrous sulfate (FEROSUL) 325 (65 Fe) MG tablet Take 325 mg by mouth 3 times daily (with meals)      Prenatal Vit-Fe Fumarate-FA (PRENATAL MULTIVITAMIN W/IRON) 27-0.8 MG tablet Take 1 tablet by mouth daily             Allergies:   Allergies   Allergen Reactions     Lactose        Discharge Exam:  /58   Pulse 94   Temp 97.8  F (36.6  C) (Oral)   Resp 18   SpO2 98%   Breastfeeding Unknown   General - alert,  comfortable  Heart - RRR, no murmurs  Lungs - CTA bilaterally  Abdomen - fundus firm, nontender, below umbilicus  Extremities - trace edema    Post-partum hemoglobin:   Hemoglobin   Date Value Ref Range Status   08/28/2021 8.0 (L) 11.7 - 15.7 g/dL Final         Completed by:   Amari Pierce MD, MD  [facility]  8/29/2021 8:36 AM

## 2021-08-29 NOTE — PROGRESS NOTES
Maternal Postpartum Progress Note  [facility]  Date of Service: 2021    Name      Megan Rodriguez         1995  MRN       [id number]  PCP        Amari Pierce        Subjective:  The patient feels well:  Voiding without difficulty, lochia normal, tolerating normal diet, and passing flatus.  Pain is well controlled with current medications.  The patient has no emotional concerns.  No calf pain or swelling.  The baby is well and being fed by both breast and bottle.    Objective:  /58   Pulse 94   Temp 97.8  F (36.6  C) (Oral)   Resp 18   SpO2 98%   Breastfeeding Unknown   Lochia is minimal.  The uterine fundus is firm at umbilicus.  Urinary output is adequate. No calf tenderness.  No edema.    Labs:  Hemoglobin   Date Value Ref Range Status   2021 8.0 (L) 11.7 - 15.7 g/dL Final       Assessment:    -Postpartum Day 2 s/p vaginal delivery  -Normal postpartum course.      Plan:    -Continue current care.  -Home today on ferrous sulfate    Kevin Pierce MD    Completed by:   Amari Pierce MD, MD, M.D.  [facility]  2021 8:39 AM

## 2021-08-29 NOTE — PLAN OF CARE
Problem: Adult Inpatient Plan of Care  Goal: Optimal Comfort and Wellbeing  Outcome: Improving     Problem: Bleeding (Postpartum Vaginal Delivery)  Goal: Hemostasis  Outcome: Improving     Problem: Pain (Postpartum Vaginal Delivery)  Goal: Acceptable Pain Control  Outcome: Improving  Intervention: Prevent or Manage Pain  Recent Flowsheet Documentation  Taken 8/28/2021 2354 by Buddy Barron, RN  Pain Management Interventions: medication (see MAR)   Perineum pain controlled with PRN Tylenol, and Ibuprofen. Pt independent with perineum cares, using tucks pads and ice also for pain management. Fundus is firm, with scant bleeding. VSS. Voiding and tolerating intake.

## 2021-12-29 ENCOUNTER — LAB REQUISITION (OUTPATIENT)
Dept: LAB | Facility: CLINIC | Age: 26
End: 2021-12-29

## 2021-12-29 DIAGNOSIS — R42 DIZZINESS AND GIDDINESS: ICD-10-CM

## 2021-12-29 PROCEDURE — 80048 BASIC METABOLIC PNL TOTAL CA: CPT | Performed by: NURSE PRACTITIONER

## 2021-12-29 PROCEDURE — 84443 ASSAY THYROID STIM HORMONE: CPT | Performed by: NURSE PRACTITIONER

## 2021-12-30 LAB
ANION GAP SERPL CALCULATED.3IONS-SCNC: 11 MMOL/L (ref 5–18)
BUN SERPL-MCNC: 19 MG/DL (ref 8–22)
CALCIUM SERPL-MCNC: 9.6 MG/DL (ref 8.5–10.5)
CHLORIDE BLD-SCNC: 104 MMOL/L (ref 98–107)
CO2 SERPL-SCNC: 24 MMOL/L (ref 22–31)
CREAT SERPL-MCNC: 0.65 MG/DL (ref 0.6–1.1)
GFR SERPL CREATININE-BSD FRML MDRD: >90 ML/MIN/1.73M2
GLUCOSE BLD-MCNC: 85 MG/DL (ref 70–125)
POTASSIUM BLD-SCNC: 3.9 MMOL/L (ref 3.5–5)
SODIUM SERPL-SCNC: 139 MMOL/L (ref 136–145)
TSH SERPL DL<=0.005 MIU/L-ACNC: 0.79 UIU/ML (ref 0.3–5)